# Patient Record
Sex: FEMALE | Race: BLACK OR AFRICAN AMERICAN | NOT HISPANIC OR LATINO | Employment: UNEMPLOYED | URBAN - METROPOLITAN AREA
[De-identification: names, ages, dates, MRNs, and addresses within clinical notes are randomized per-mention and may not be internally consistent; named-entity substitution may affect disease eponyms.]

---

## 2021-09-17 ENCOUNTER — OFFICE VISIT (OUTPATIENT)
Dept: FAMILY MEDICINE CLINIC | Facility: CLINIC | Age: 36
End: 2021-09-17

## 2021-09-17 VITALS
SYSTOLIC BLOOD PRESSURE: 116 MMHG | OXYGEN SATURATION: 97 % | WEIGHT: 121.8 LBS | HEART RATE: 71 BPM | DIASTOLIC BLOOD PRESSURE: 66 MMHG

## 2021-09-17 DIAGNOSIS — M79.642 LEFT HAND PAIN: ICD-10-CM

## 2021-09-17 DIAGNOSIS — J45.20 MILD INTERMITTENT ASTHMA, UNSPECIFIED WHETHER COMPLICATED: ICD-10-CM

## 2021-09-17 DIAGNOSIS — R20.0 ARM NUMBNESS LEFT: Primary | ICD-10-CM

## 2021-09-17 PROCEDURE — 99204 OFFICE O/P NEW MOD 45 MIN: CPT | Performed by: INTERNAL MEDICINE

## 2021-09-17 RX ORDER — ALBUTEROL SULFATE 90 UG/1
2 AEROSOL, METERED RESPIRATORY (INHALATION) EVERY 4 HOURS PRN
Qty: 17 G | Refills: 6 | Status: SHIPPED | OUTPATIENT
Start: 2021-09-17

## 2021-09-17 NOTE — PROGRESS NOTES
Raquel Hartley  1985  [id number]  Patient Care Team:  Riley Addison MD as PCP - General (Internal Medicine)    Raquel Hartley is a 36 y.o. female here today to establish care.  This patient is accompanied by their self who contributes to the history of their care.    Chief Complaint:    Chief Complaint   Patient presents with   • Establish Care     Pt is 22 weeks pregnant. [place] [facility] where she was living. SHe would like blood work .   • Cough     Thinks she had a heat stroke in 2016 , since then she has had a slight cough and Lt hand tremor. AT times shortness of breath.         History of Present Illness:     In 2016 became overheated ( 115 degrees) was delerious, temporary lOC, double vision.Was unable to sweat. Has lived in [facility] and [place]    Reports since this time her left pinky can not be closed (adducted). Actually fell on this side. Not much recollection of events, Did not seek medical care. Pain down right arm. Had some dry cough with horaseness. Tb negative in 2019. CXR at this time ws taken. ( Tb negative). 4-5 utis per year since age 21. Better since pregnant    Was asthmatic as teenager, stopped advair at age 25 as she felt she was better  No wheezing but gets SOA.  Denies any chest pain.  Hemoptysis sputum production no postnasal drainage.  She does get reflux however this is only since she has been pregnant.    Past Medical History:   Diagnosis Date   • Anemia    • Anxiety    • Asthma    • Depression        History reviewed. No pertinent surgical history.     Family History   Problem Relation Age of Onset   • Diabetes Mother    • Cancer Maternal Grandmother         skin   • Cancer Paternal Grandmother         colon   • Stroke Neg Hx    • Hearing loss Neg Hx        Social History     Socioeconomic History   • Marital status:      Spouse name: Not on file   • Number of children: Not on file   • Years of education: Not on file   •  Highest education level: Not on file   Tobacco Use   • Smoking status: Never Smoker   • Smokeless tobacco: Never Used   Vaping Use   • Vaping Use: Never used   Substance and Sexual Activity   • Alcohol use: Never   • Drug use: Never   • Sexual activity: Defer       No Known Allergies    Review of Systems:    Review of Systems   Constitutional: Negative for fatigue and fever.   HENT: Negative.    Respiratory: Positive for cough and shortness of breath.    Cardiovascular: Negative for chest pain and palpitations.   Gastrointestinal: Positive for GERD.   Endocrine: Negative for cold intolerance and heat intolerance.   Genitourinary: Positive for amenorrhea and urgency.   Musculoskeletal: Positive for back pain and neck pain.   Neurological: Positive for numbness and headache.       Vitals:    09/17/21 0959   BP: 116/66   Pulse: 71   SpO2: 97%   Weight: 55.2 kg (121 lb 12.8 oz)     There is no height or weight on file to calculate BMI.      Current Outpatient Medications:   •  albuterol sulfate  (90 Base) MCG/ACT inhaler, Inhale 2 puffs Every 4 (Four) Hours As Needed for Wheezing., Disp: 17 g, Rfl: 6    Physical Exam:    Physical Exam  Vitals and nursing note reviewed.   Constitutional:       General: She is not in acute distress.     Appearance: Normal appearance. She is well-developed. She is not diaphoretic.   HENT:      Head: Normocephalic and atraumatic.      Right Ear: Tympanic membrane and external ear normal.      Left Ear: Tympanic membrane and external ear normal.      Mouth/Throat:      Mouth: Mucous membranes are moist.      Pharynx: No oropharyngeal exudate.   Eyes:      General: No scleral icterus.        Right eye: No discharge.      Extraocular Movements: Extraocular movements intact.      Conjunctiva/sclera: Conjunctivae normal.      Pupils: Pupils are equal, round, and reactive to light.   Neck:      Thyroid: No thyromegaly.      Vascular: No JVD.      Trachea: No tracheal deviation.    Cardiovascular:      Rate and Rhythm: Normal rate and regular rhythm.      Heart sounds: Normal heart sounds.      Comments: PMI nondisplaced  Pulmonary:      Effort: Pulmonary effort is normal.      Breath sounds: Normal breath sounds. No wheezing or rales.   Abdominal:      General: Bowel sounds are normal. There is no distension.      Palpations: Abdomen is soft.      Tenderness: There is no abdominal tenderness. There is no guarding or rebound.      Comments: Consistent with gravid state   Musculoskeletal:      Cervical back: Normal range of motion and neck supple.      Comments: Normal gait.  She does have a left hyperthenar wasting.  She is unable to fully adductor her little finger.  There is tenderness on the lateral aspect of her hand near the MCP.  Interestingly interosseous strength feels appropriate bilaterally and symmetrical   Lymphadenopathy:      Cervical: No cervical adenopathy.   Skin:     General: Skin is warm and dry.      Capillary Refill: Capillary refill takes less than 2 seconds.      Coloration: Skin is not pale.      Findings: No rash.   Neurological:      Mental Status: She is alert and oriented to person, place, and time.      Cranial Nerves: No cranial nerve deficit.      Motor: No abnormal muscle tone.      Coordination: Coordination normal.   Psychiatric:         Mood and Affect: Mood normal.         Behavior: Behavior normal.         Judgment: Judgment normal.         Procedures    Results Review:    None    Assessment/Plan:     Problem List Items Addressed This Visit        Musculoskeletal and Injuries    Left hand pain    Relevant Orders    EMG & Nerve Conduction Test    Ambulatory Referral to Hand Surgery    Iron Profile    CBC & Differential       Pulmonary and Pneumonias    Asthma    Current Assessment & Plan     Asthma is newly identified.  The patient is experiencing frequent daytime asthma symptoms. She is experiencing no nighttime asthma symptoms.  Patient to keep asthma  diary.  Counseled to void exposure to cigarette smoke.  I suspect she has cough variant asthma.  She was on significantly maintenance therapy for asthma and stopped since age 25  I would like to place her back on albuterol as therapeutic/diagnostic trial.  However given her gravid state of asked for her to get her OB's blessing on this.  If symptoms do not improve I will refer her to pulmonary medicine for evaluation of cough.             Relevant Medications    albuterol sulfate  (90 Base) MCG/ACT inhaler       Symptoms and Signs    Arm numbness left - Primary    Relevant Orders    Ambulatory Referral to Neurology    EMG & Nerve Conduction Test    Ambulatory Referral to Hand Surgery    Iron Profile    CBC & Differential          Plan of care reviewed with patient at the conclusion of today's visit. Education was provided regarding diagnosis and management.  Patient verbalizes understanding of and agreement with management plan.    Return in about 6 weeks (around 10/29/2021).    Riley Addison MD    Please note that portions of this note may have been completed with a voice recognition program. Efforts were made to edit the dictations, but occasionally words are mistranscribed.

## 2021-09-17 NOTE — ASSESSMENT & PLAN NOTE
Asthma is newly identified.  The patient is experiencing frequent daytime asthma symptoms. She is experiencing no nighttime asthma symptoms.  Patient to keep asthma diary.  Counseled to void exposure to cigarette smoke.  I suspect she has cough variant asthma.  She was on significantly maintenance therapy for asthma and stopped since age 25  I would like to place her back on albuterol as therapeutic/diagnostic trial.  However given her gravid state of asked for her to get her OB's blessing on this.  If symptoms do not improve I will refer her to pulmonary medicine for evaluation of cough.

## 2021-10-05 ENCOUNTER — TELEPHONE (OUTPATIENT)
Dept: FAMILY MEDICINE CLINIC | Facility: CLINIC | Age: 36
End: 2021-10-05

## 2021-10-05 NOTE — TELEPHONE ENCOUNTER
Caller:  ORTHOPEDIC    Relationship to patient:     Best call back number: [phone]    UNABLE TO REACH PATIENT TO SCHEDULE APPOINTMENT

## 2021-12-03 ENCOUNTER — OFFICE VISIT (OUTPATIENT)
Dept: NEUROLOGY | Facility: CLINIC | Age: 36
End: 2021-12-03

## 2021-12-03 ENCOUNTER — OFFICE VISIT (OUTPATIENT)
Dept: FAMILY MEDICINE CLINIC | Facility: CLINIC | Age: 36
End: 2021-12-03

## 2021-12-03 VITALS
DIASTOLIC BLOOD PRESSURE: 70 MMHG | BODY MASS INDEX: 21.51 KG/M2 | SYSTOLIC BLOOD PRESSURE: 118 MMHG | HEART RATE: 104 BPM | WEIGHT: 126 LBS | OXYGEN SATURATION: 98 % | HEIGHT: 64 IN

## 2021-12-03 VITALS
HEIGHT: 64 IN | SYSTOLIC BLOOD PRESSURE: 114 MMHG | DIASTOLIC BLOOD PRESSURE: 76 MMHG | WEIGHT: 124 LBS | BODY MASS INDEX: 21.17 KG/M2 | OXYGEN SATURATION: 98 % | HEART RATE: 100 BPM | TEMPERATURE: 97.1 F

## 2021-12-03 DIAGNOSIS — J45.20 MILD INTERMITTENT ASTHMA, UNSPECIFIED WHETHER COMPLICATED: Primary | ICD-10-CM

## 2021-12-03 DIAGNOSIS — R20.0 ARM NUMBNESS LEFT: Primary | ICD-10-CM

## 2021-12-03 PROBLEM — J06.9 VIRAL UPPER RESPIRATORY TRACT INFECTION: Status: ACTIVE | Noted: 2021-12-03

## 2021-12-03 PROCEDURE — 99204 OFFICE O/P NEW MOD 45 MIN: CPT | Performed by: PSYCHIATRY & NEUROLOGY

## 2021-12-03 PROCEDURE — 99213 OFFICE O/P EST LOW 20 MIN: CPT | Performed by: INTERNAL MEDICINE

## 2021-12-03 RX ORDER — PNV NO.95/FERROUS FUM/FOLIC AC 28MG-0.8MG
TABLET ORAL
COMMUNITY

## 2021-12-03 RX ORDER — AZITHROMYCIN 500 MG/1
TABLET, FILM COATED ORAL
COMMUNITY
Start: 2021-09-23 | End: 2021-12-03

## 2021-12-03 NOTE — PROGRESS NOTES
"Raquel Hartley  1985  [id number]  Patient Care Team:  Riley Addison MD as PCP - General (Internal Medicine)    Raquel Hartley is a 36 y.o. female here today for follow up.     This patient is accompanied by their self who contributes to the history of their care.    Chief Complaint:    Chief Complaint   Patient presents with   • Asthma   • Sore Throat     Has been to [facility] (formerly [facility]          History of Present Illness:  I have reviewed and/or updated the patient's past medical, past surgical, family, social history, problem list and allergies as appropriate.     Third trimester pregnancy. She does have asthma.  Having increased SOA. She is unsure whether it is 2/2 to increased abdominal signs or asthma. She has been taking her inhaler less than once per day. Her symptoms are typically at night. She denies any knowledge of reflux or feeling of nausea or vomiting. But she does indicate her shortness of breath is when she is supine.  Denies any calf pain or leg pain no leg swelling.  Denies any chest pain or leg pain. No fevers or chills. There has been some sore throat. She recently flew back from [place]. no sinus pain or pressure.    Review of Systems   Constitutional: Positive for fatigue.   HENT: Negative.    Eyes: Negative.    Respiratory: Positive for cough and shortness of breath.    Cardiovascular: Negative for chest pain, palpitations and leg swelling.       Vitals:    12/03/21 1227   BP: 118/70   Pulse: 104   SpO2: 98%   Weight: 57.2 kg (126 lb)   Height: 162.6 cm (64\")     Body mass index is 21.63 kg/m².    Physical Exam  Vitals and nursing note reviewed.   Constitutional:       General: She is not in acute distress.     Appearance: She is well-developed. She is not diaphoretic.   HENT:      Head: Normocephalic and atraumatic.      Right Ear: External ear normal.      Left Ear: External ear normal.      Mouth/Throat:      Pharynx: No oropharyngeal exudate.   Eyes:      " General: No scleral icterus.        Right eye: No discharge.      Conjunctiva/sclera: Conjunctivae normal.   Neck:      Thyroid: No thyromegaly.      Vascular: No JVD.      Trachea: No tracheal deviation.   Cardiovascular:      Rate and Rhythm: Normal rate and regular rhythm.      Heart sounds: Normal heart sounds.      Comments: PMI nondisplaced  Pulmonary:      Effort: Pulmonary effort is normal. No respiratory distress.      Breath sounds: Normal breath sounds. No wheezing or rales.   Abdominal:      General: Bowel sounds are normal.      Palpations: Abdomen is soft.      Tenderness: There is no abdominal tenderness. There is no guarding or rebound.      Comments: Gravid   Musculoskeletal:      Cervical back: Normal range of motion and neck supple.      Comments: Normal gait   Lymphadenopathy:      Cervical: No cervical adenopathy.   Skin:     General: Skin is warm and dry.      Capillary Refill: Capillary refill takes less than 2 seconds.      Coloration: Skin is not pale.      Findings: No rash.   Neurological:      Mental Status: She is alert and oriented to person, place, and time.      Motor: No abnormal muscle tone.      Coordination: Coordination normal.   Psychiatric:         Judgment: Judgment normal.         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  I further suspect her reactive airways are precipitated by normal only impacted reflux which is worse when she is supine.  I have asked her to place blocks in the event of Lexapro bed once her  is available to do this.  I have asked her to check with her OB.  I personally feel that she is okay to use her inhaler once per day however I have asked her to discuss this with her OB.  I would recommend Tums at bedtime.  Problem List Items Addressed This Visit        Pulmonary and Pneumonias    Asthma -  Primary    Relevant Medications    albuterol sulfate  (90 Base) MCG/ACT inhaler          Plan of care reviewed with patient at the conclusion of today's visit. Education was provided regarding diagnosis and management.  Patient verbalizes understanding of and agreement with management plan.    No follow-ups on file.    Riley Addison MD      Please note than portions of this note were completed [facility] a Voice Recognition Program

## 2021-12-03 NOTE — PROGRESS NOTES
"Chief Complaint  Extremity Weakness    Subjective          Raquel Hartley presents to [facility]UR[facility]     History of Present Illness    36 y.o. female referred by Dr Riley Addison for left hand weakness.     2016 heat stroke LOC for severe minutes.  Fell backwards on left side. Awoke with pain in left 5 digit.      Residual pain in numbness in 5 digit.      Weakness of movement in fifth digit.      Objective   Vital Signs:   /76   Pulse 100   Temp 97.1 °F (36.2 °C)   Ht 162.6 cm (64.02\")   Wt 56.2 kg (124 lb)   SpO2 98%   BMI 21.27 kg/m²     Physical Exam  Eyes:      Extraocular Movements: EOM normal.      Pupils: Pupils are equal, round, and reactive to light.   Neurological:      Mental Status: She is oriented to person, place, and time.      Coordination: Finger-Nose-Finger Test, Heel to Shin Test and Romberg Test normal.      Gait: Gait is intact. Tandem walk normal.      Deep Tendon Reflexes: Strength normal.   Psychiatric:         Speech: Speech normal.          Neurologic Exam     Mental Status   Oriented to person, place, and time.   Registration: recalls 3 of 3 objects. Recall at 5 minutes: recalls 3 of 3 objects. Follows 3 step commands.   Attention: normal. Concentration: normal.   Speech: speech is normal   Level of consciousness: alert  Knowledge: good and consistent with education.   Able to name object. Able to read. Able to repeat. Able to write. Normal comprehension.     Cranial Nerves     CN II   Visual fields full to confrontation.   Visual acuity: normal  Right visual field deficit: none  Left visual field deficit: none     CN III, IV, VI   Pupils are equal, round, and reactive to light.  Extraocular motions are normal.   Nystagmus: none   Diplopia: none  Ophthalmoparesis: none  Upgaze: normal  Downgaze: normal  Conjugate gaze: present  Vestibulo-ocular reflex: present    CN V   Facial sensation intact.   Right corneal reflex: normal  Left corneal " reflex: normal    CN VII   Right facial weakness: none  Left facial weakness: none    CN VIII   Hearing: intact    CN IX, X   Palate: symmetric  Right gag reflex: normal  Left gag reflex: normal    CN XI   Right sternocleidomastoid strength: normal  Left sternocleidomastoid strength: normal    CN XII   Tongue: not atrophic  Fasciculations: absent  Tongue deviation: none    Motor Exam   Muscle bulk: normal  Overall muscle tone: normal  Right arm tone: normal  Left arm tone: normal  Right leg tone: normal  Left leg tone: normal    Strength   Strength 5/5 throughout.     Sensory Exam   Light touch normal.   Pinprick normal.   Sensory deficit distribution on left: ulnar    Gait, Coordination, and Reflexes     Gait  Gait: normal    Coordination   Romberg: negative  Finger to nose coordination: normal  Heel to shin coordination: normal  Tandem walking coordination: normal    Tremor   Resting tremor: absent  Intention tremor: absent  Action tremor: absent    Reflexes   Reflexes 2+ except as noted.      Result Review :   The following data was reviewed by: Emiliano Hoyos MD on 12/03/2021:                Assessment and Plan    Diagnoses and all orders for this visit:    1. Arm numbness left (Primary)  Assessment & Plan:  Suspect left ulnar neuropathy at the cubital tunnel    EMG/NCS L UE     Orders:  -     Nerve Conduction Test; Future      Follow Up   No follow-ups on file.  Patient was given instructions and counseling regarding her condition or for health maintenance advice. Please see specific information pulled into the AVS if appropriate.

## 2021-12-06 ENCOUNTER — PROCEDURE VISIT (OUTPATIENT)
Dept: NEUROLOGY | Facility: CLINIC | Age: 36
End: 2021-12-06

## 2021-12-06 DIAGNOSIS — G56.22 ULNAR NEUROPATHY OF LEFT UPPER EXTREMITY: Primary | ICD-10-CM

## 2021-12-06 PROCEDURE — 95909 NRV CNDJ TST 5-6 STUDIES: CPT | Performed by: PSYCHIATRY & NEUROLOGY

## 2021-12-06 PROCEDURE — 95886 MUSC TEST DONE W/N TEST COMP: CPT | Performed by: PSYCHIATRY & NEUROLOGY

## 2021-12-06 NOTE — PROGRESS NOTES
[facility]   Electrodiagnostic Laboratory    Nerve Conduction & EMG Report        Patient:  Raquel Hartley   Patient ID: [id number]   YOB: 1985  Sex:  female      Exam Physician:  Emiliano Hoyos MD       Electromyogram and Nerve Conduction Velocity Procedure Note    Hx: 36 y.o. left handed female with complaint of weakness involving the left arm. Symptoms have been present for several years and were provoked by trauma after a fall. Significant past medical history includes nothing suggestive of neuropathy.  Family history no family history of nerve or muscle disease.    Exam: Motor power is normal and distal weakness in the left arm. There is no atrophy. There are no fasciculations. Deep tendon reflexes are present and symmetrical. Sensory exam is normal.      Edx studies of the L UE were performed to evaluate for peripheral nerve entrapment.    NCS Examination   For sensory nerve conduction studies, the amplitude is measured peak-to-peak, the latency reported is the distal peak latency, and the conduction velocity, if measured, is determined from onset latencies and is over the forearm.   For motor nerve conduction studies, the amplitude is measured baseline-to-peak, the latency reported is the distal onset latency, the conduction velocity is calculated over the forearm, and the F wave latency is the minimum latency.   Unless otherwise noted, the hand temperature was monitored continuously and remained between 32°C and 36°C during the performance of the NCSs.        Sensory NCS      Nerve / Sites Rec. Site Onset Lat Peak Lat NP Amp PP Amp Segments Distance Velocity     ms ms µV µV  cm m/s   L Median - Digit II (Antidromic)      Wrist Dig II 2.19 2.86 88.5 152.5 Wrist - Dig II 13 59   L Ulnar - Digit V (Antidromic)      Wrist Dig V 2.03 2.92 106.6 95.3 Wrist - Dig V 11 54   L Radial - Anatomical snuff box (Forearm)      Forearm Wrist 1.09 1.72 51.8 45.5 Forearm -  Wrist 10 91             Motor NCS      Nerve / Sites Muscle Latency Amplitude Amp % Duration Segments Distance Lat Diff Velocity     ms mV % ms  cm ms m/s   L Median - APB      Wrist APB 2.71 16.0 100 5.42 Wrist - APB 7        Elbow APB 6.09 6.2 38.9 6.72 Elbow - Wrist  3.39    L Ulnar - ADM      Wrist ADM 2.55 10.7 100 5.63 Wrist - ADM 7        B.Elbow ADM 6.35 9.5 89.2 5.42 B.Elbow - Wrist 19 3.80 50      A.Elbow ADM 7.50 8.1 76.4 5.42 A.Elbow - B.Elbow 9 1.15 79           F  Wave      Nerve F Lat M Lat F-M Lat    ms ms ms   L Median - APB 24.2 2.4 21.8   L Ulnar - ADM 27.6 2.3 25.2             EMG Examination   The study was performed with a concentric needle electrode. Fibrillation and fasciculation activity is graded from none (0) to continuous (4+). The configuration and recruitment pattern of motor unit action potentials under voluntary control, if not normal, are described below         EMG Summary Table     Spontaneous MUAP Recruitment   Muscle Nerve Roots IA Fib PSW Fasc H.F. Amp Dur. PPP Pattern   L. Abductor pollicis brevis Median C8-T1 N None None None None N N N N   L. First dorsal interosseous Ulnar C8-T1 N None None None None 1+ N N Reduced   L. Flexor carpi ulnaris Ulnar C7-T1 N None None None None 1+ N N Reduced   L. Pronator teres Median C6-C7 N None None None None N N N N   L. Triceps brachii Radial C6-C8 N None None None None N N N N   L. Biceps brachii Musculocutaneous C5-C6 N None None None None N N N N   L. Deltoid Axillary C5-C6 N None None None None N N N N   L. Abductor digiti minimi (pedis) Tibial S1-S2 N None None None None 1+ N N Reduced       Summary    The motor conduction test was normal in all 2 of the tested nerves: L Median - APB, L Ulnar - ADM.    The sensory conduction test was normal in all 3 of the tested nerves: L Median - Digit II (Antidromic), L Ulnar - Digit V (Antidromic), L Radial - Anatomical snuff box (Forearm).    The F wave study was normal in all 2 of the tested  nerves: L Median - APB, L Ulnar - ADM.    The needle EMG examination was performed in 8 muscles. It was normal in 5 muscle(s): L. Abductor pollicis brevis, L. Pronator teres, L. Triceps brachii, L. Biceps brachii, L. Deltoid. The study was abnormal in 3 muscle(s), with the following distribution:  • The MUP waveform abnormality was found in L. First dorsal interosseous, L. Flexor carpi ulnaris, L. Abductor digiti minimi (pedis).  • Abnormal interference pattern was found in L. First dorsal interosseous, L. Flexor carpi ulnaris, L. Abductor digiti minimi (pedis).                 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            Instrument used:  T[employer] Synergy        Performed by:          Emiliano Hoyos MD

## 2023-10-19 ENCOUNTER — TELEPHONE (OUTPATIENT)
Dept: FAMILY MEDICINE CLINIC | Facility: CLINIC | Age: 38
End: 2023-10-19
Payer: MEDICAID

## 2023-10-19 NOTE — TELEPHONE ENCOUNTER
Left message for Raquel Hartley  to return my call.    Hub may relay message and document     Flora Mahmood, DO  You2 minutes ago (3:52 PM)       I attempted to call the patient back but the phone number listed in Epic states its not in service. If the chest pain is new/acute onset then I would agree with recommendation to go to the ER. If this is something she has been experiencing on/off over a period of the last few weeks then she should be scheduled for an appointment soon so we can evaluate this further. She may need to schedule for the chest pain and come back for her physical at a later time

## 2023-10-19 NOTE — TELEPHONE ENCOUNTER
I attempted to call the patient back but the phone number listed in Epic states its not in service. If the chest pain is new/acute onset then I would agree with recommendation to go to the ER. If this is something she has been experiencing on/off over a period of the last few weeks then she should be scheduled for an appointment soon so we can evaluate this further. She may need to schedule for the chest pain and come back for her physical at a later time

## 2023-10-19 NOTE — TELEPHONE ENCOUNTER
"Pt requested a physical for November, but stated she has been having some chest pains. I scheduled the pt and then referred her to the ER. Her response is as follows..\"the chest pain I am have is inconsistent and spare of the moment. Would it be a good idea to wait for an appointment or still visit ER?\" Please advise.   "

## 2023-10-20 NOTE — TELEPHONE ENCOUNTER
"Sent pt a message via [employer] requesting an updated phone number and letting her know what Dr. Mahmood said. Will update with her response.     \"Unfortunately, my phone does not work outside of the [place]. Once I return on the 30th, then I'll be able to accept calls.     Yes, the pain comes and goes and has been this way for maybe 10 months now. It happens 1-2 times a month. The pain is directly in the middle on my chest and sometimes last for a day of two.     I am ok with 2 appointments if needed; 1 for the chest pain and another for the physical.\"    Scheduled pt with Araceli on 11/3 at 9am.   "

## 2023-11-03 ENCOUNTER — LAB (OUTPATIENT)
Dept: LAB | Facility: HOSPITAL | Age: 38
End: 2023-11-03
Payer: MEDICAID

## 2023-11-03 ENCOUNTER — TELEPHONE (OUTPATIENT)
Dept: FAMILY MEDICINE CLINIC | Facility: CLINIC | Age: 38
End: 2023-11-03

## 2023-11-03 ENCOUNTER — OFFICE VISIT (OUTPATIENT)
Dept: FAMILY MEDICINE CLINIC | Facility: CLINIC | Age: 38
End: 2023-11-03
Payer: MEDICAID

## 2023-11-03 ENCOUNTER — HOSPITAL ENCOUNTER (OUTPATIENT)
Dept: GENERAL RADIOLOGY | Facility: HOSPITAL | Age: 38
Discharge: HOME | End: 2023-11-03
Payer: MEDICAID

## 2023-11-03 VITALS
HEIGHT: 64 IN | DIASTOLIC BLOOD PRESSURE: 80 MMHG | WEIGHT: 117 LBS | BODY MASS INDEX: 19.97 KG/M2 | OXYGEN SATURATION: 99 % | HEART RATE: 78 BPM | SYSTOLIC BLOOD PRESSURE: 110 MMHG

## 2023-11-03 DIAGNOSIS — R07.9 INTERMITTENT CHEST PAIN: Primary | ICD-10-CM

## 2023-11-03 DIAGNOSIS — R07.9 INTERMITTENT CHEST PAIN: ICD-10-CM

## 2023-11-03 LAB
ALBUMIN SERPL-MCNC: 4.6 G/DL (ref 3.5–5.2)
ALBUMIN/GLOB SERPL: 1.4 G/DL
ALP SERPL-CCNC: 57 U/L (ref 39–117)
ALT SERPL W P-5'-P-CCNC: 11 U/L (ref 1–33)
ANION GAP SERPL CALCULATED.3IONS-SCNC: 9.3 MMOL/L (ref 5–15)
AST SERPL-CCNC: 14 U/L (ref 1–32)
BASOPHILS # BLD AUTO: 0.04 10*3/MM3 (ref 0–0.2)
BASOPHILS NFR BLD AUTO: 0.6 % (ref 0–1.5)
BILIRUB SERPL-MCNC: 0.3 MG/DL (ref 0–1.2)
BUN SERPL-MCNC: 10 MG/DL (ref 6–20)
BUN/CREAT SERPL: 13.3 (ref 7–25)
CALCIUM SPEC-SCNC: 9.8 MG/DL (ref 8.6–10.5)
CHLORIDE SERPL-SCNC: 102 MMOL/L (ref 98–107)
CHOLEST SERPL-MCNC: 171 MG/DL (ref 0–200)
CO2 SERPL-SCNC: 26.7 MMOL/L (ref 22–29)
CREAT SERPL-MCNC: 0.75 MG/DL (ref 0.57–1)
DEPRECATED RDW RBC AUTO: 41.3 FL (ref 37–54)
EGFRCR SERPLBLD CKD-EPI 2021: 106 ML/MIN/1.73
EOSINOPHIL # BLD AUTO: 0.07 10*3/MM3 (ref 0–0.4)
EOSINOPHIL NFR BLD AUTO: 1 % (ref 0.3–6.2)
ERYTHROCYTE [DISTWIDTH] IN BLOOD BY AUTOMATED COUNT: 13.5 % (ref 12.3–15.4)
GLOBULIN UR ELPH-MCNC: 3.3 GM/DL
GLUCOSE SERPL-MCNC: 91 MG/DL (ref 65–99)
HCT VFR BLD AUTO: 38.3 % (ref 34–46.6)
HDLC SERPL-MCNC: 59 MG/DL (ref 40–60)
HGB BLD-MCNC: 12.7 G/DL (ref 12–15.9)
IMM GRANULOCYTES # BLD AUTO: 0.01 10*3/MM3 (ref 0–0.05)
IMM GRANULOCYTES NFR BLD AUTO: 0.1 % (ref 0–0.5)
LDLC SERPL CALC-MCNC: 102 MG/DL (ref 0–100)
LDLC/HDLC SERPL: 1.73 {RATIO}
LYMPHOCYTES # BLD AUTO: 2.77 10*3/MM3 (ref 0.7–3.1)
LYMPHOCYTES NFR BLD AUTO: 40.1 % (ref 19.6–45.3)
MCH RBC QN AUTO: 27.9 PG (ref 26.6–33)
MCHC RBC AUTO-ENTMCNC: 33.2 G/DL (ref 31.5–35.7)
MCV RBC AUTO: 84 FL (ref 79–97)
MONOCYTES # BLD AUTO: 0.45 10*3/MM3 (ref 0.1–0.9)
MONOCYTES NFR BLD AUTO: 6.5 % (ref 5–12)
NEUTROPHILS NFR BLD AUTO: 3.57 10*3/MM3 (ref 1.7–7)
NEUTROPHILS NFR BLD AUTO: 51.7 % (ref 42.7–76)
NRBC BLD AUTO-RTO: 0 /100 WBC (ref 0–0.2)
PLATELET # BLD AUTO: 310 10*3/MM3 (ref 140–450)
PMV BLD AUTO: 11.3 FL (ref 6–12)
POTASSIUM SERPL-SCNC: 4 MMOL/L (ref 3.5–5.2)
PROT SERPL-MCNC: 7.9 G/DL (ref 6–8.5)
RBC # BLD AUTO: 4.56 10*6/MM3 (ref 3.77–5.28)
SODIUM SERPL-SCNC: 138 MMOL/L (ref 136–145)
TRIGL SERPL-MCNC: 49 MG/DL (ref 0–150)
TROPONIN T SERPL HS-MCNC: <6 NG/L
TSH SERPL DL<=0.05 MIU/L-ACNC: 1.02 UIU/ML (ref 0.27–4.2)
VLDLC SERPL-MCNC: 10 MG/DL (ref 5–40)
WBC NRBC COR # BLD: 6.91 10*3/MM3 (ref 3.4–10.8)

## 2023-11-03 PROCEDURE — 71046 X-RAY EXAM CHEST 2 VIEWS: CPT

## 2023-11-03 PROCEDURE — 36415 COLL VENOUS BLD VENIPUNCTURE: CPT

## 2023-11-03 PROCEDURE — 93000 ELECTROCARDIOGRAM COMPLETE: CPT

## 2023-11-03 PROCEDURE — 80050 GENERAL HEALTH PANEL: CPT

## 2023-11-03 PROCEDURE — 80061 LIPID PANEL: CPT

## 2023-11-03 PROCEDURE — 99214 OFFICE O/P EST MOD 30 MIN: CPT

## 2023-11-03 PROCEDURE — 1160F RVW MEDS BY RX/DR IN RCRD: CPT

## 2023-11-03 PROCEDURE — 84484 ASSAY OF TROPONIN QUANT: CPT

## 2023-11-03 PROCEDURE — 1159F MED LIST DOCD IN RCRD: CPT

## 2023-11-03 NOTE — TELEPHONE ENCOUNTER
----- Message from Araceli Garcia PA-C sent at 11/3/2023  3:59 PM EDT -----    Please let patient know that her labs look excellent. We will discuss further work-up for her symptoms on Monday at her physical.     Attempted to contact patient, no answer. M with office # given.     Hub may relay message and document.

## 2023-11-03 NOTE — PROGRESS NOTES
"Answers submitted by the patient for this visit:  Other (Submitted on 11/3/2023)  Please describe your symptoms.: Chest Pain  Have you had these symptoms before?: Yes  How long have you been having these symptoms?: Greater than 2 weeks  Primary Reason for Visit (Submitted on 11/3/2023)  What is the primary reason for your visit?: Other      Office Note     Name: Raquel Hartley    : 1985     MRN: [id number]     Chief Complaint  Chest Pain    Subjective     History of Present Illness:  Raquel Hartley is a 38 y.o. female who presents today with complaints of intermittent chest pain for the last year and a half.  Patient states that the pain feels like more of a \"pressure\" in the center of her chest and will sometimes radiate into her shoulder blades bilaterally. Patient states that these episodes will occur when she gets upset with her .  She denies pain with exertion.  She states that it will just come on randomly. She states that it will happen 1-2 times per month for the last 10+ months. She states that sometimes it will make her short of breath, but not every time that the pain occurs does this happen. Patient states she has never tried to use her albuterol inhaler when this symptom  occurs.   Patient states that she can press on the center of her chest and the sensation will come on \"a little.\" Patient denies current chest pain, palpitations, shortness of breath, ripping back pain, syncope. Patient denies association with eating.     Patient states she was living in the [place] several years ago. She remembers that she developed a nonproductive cough while she was there. She states she did not have health insurance at that time so she did not pursue further testing.  Patient states when she left [place] the cough went away. She denies fevers, unexpected weight loss, night sweats.    Patient then moved to [facility] and met her  after leaving [place].  She states that in " "[place] she did have a chest x-ray done that she states she was told showed \"something,\" but was told it was not tuberculosis.  Patient states she was last tested for tuberculosis with a skin test in 2018. She states that she had her son in January 2022 and first noticed the chest pain/pressure 4 months later.    Patient states there is no possibility of pregnancy, she had her last menstrual cycle last week.    Review of Systems:   Review of Systems   Constitutional:  Negative for fever and unexpected weight loss.   HENT:  Negative for congestion.    Respiratory:  Negative for cough, chest tightness and shortness of breath.    Cardiovascular:  Negative for chest pain and palpitations.   Musculoskeletal:  Negative for arthralgias.       Past Medical History:   Past Medical History:   Diagnosis Date    Anemia     Anxiety     Asthma     Depression     Seizures        Past Surgical History: History reviewed. No pertinent surgical history.    Family History:   Family History   Problem Relation Age of Onset    Diabetes Mother     Cancer Maternal Grandmother         skin    Cancer Paternal Grandmother         colon    Stroke Neg Hx     Hearing loss Neg Hx        Social History:   Social History     Socioeconomic History    Marital status:    Tobacco Use    Smoking status: Never    Smokeless tobacco: Never   Vaping Use    Vaping Use: Never used   Substance and Sexual Activity    Alcohol use: Never    Drug use: Never    Sexual activity: Defer       Immunizations:   There is no immunization history on file for this patient.     Medications:     Current Outpatient Medications:     albuterol sulfate  (90 Base) MCG/ACT inhaler, Inhale 2 puffs Every 4 (Four) Hours As Needed for Wheezing., Disp: 17 g, Rfl: 6    Prenatal Vit-Fe Fumarate-FA (Prenatal Vitamin) 27-0.8 MG tablet, Take  by mouth., Disp: , Rfl:     Allergies:   No Known Allergies    Objective     Vital Signs  /80 (BP Location: Left arm, Patient " "Position: Sitting, Cuff Size: Adult)   Pulse 78   Ht 162.6 cm (64.02\")   Wt 53.1 kg (117 lb)   SpO2 99%   BMI 20.07 kg/m²   Estimated body mass index is 20.07 kg/m² as calculated from the following:    Height as of this encounter: 162.6 cm (64.02\").    Weight as of this encounter: 53.1 kg (117 lb).    BMI is within normal parameters. No other follow-up for BMI required.       Physical Exam  Constitutional:       General: She is not in acute distress.     Appearance: Normal appearance. She is not ill-appearing or diaphoretic.   HENT:      Head: Normocephalic and atraumatic.      Mouth/Throat:      Pharynx: No posterior oropharyngeal erythema.   Eyes:      Pupils: Pupils are equal, round, and reactive to light.   Cardiovascular:      Rate and Rhythm: Normal rate and regular rhythm. Bradycardia present.      Pulses: Normal pulses.      Heart sounds: Normal heart sounds. No murmur heard.     No friction rub.   Pulmonary:      Effort: Pulmonary effort is normal.      Breath sounds: Normal breath sounds.   Abdominal:      General: Abdomen is flat. Bowel sounds are normal.   Lymphadenopathy:      Cervical: No cervical adenopathy.   Skin:     General: Skin is warm.   Neurological:      General: No focal deficit present.      Mental Status: She is alert and oriented to person, place, and time.   Psychiatric:         Mood and Affect: Mood normal.              ECG 12 Lead    Date/Time: 11/3/2023 9:44 AM  Performed by: Araceli Garcia PA-C    Authorized by: Araceli Garcia PA-C  Previous ECG: no previous ECG available  Rhythm: sinus bradycardia  Rate: bradycardic  Other findings: non-specific ST-T wave changes    Clinical impression: abnormal EKG and non-specific ECG  Comments: Shortened DC interval           Results:  No results found for this or any previous visit (from the past 24 hour(s)).     Assessment and Plan     Assessment/Plan:  Diagnoses and all orders for this visit:    1. Intermittent chest pain " (Primary)  Assessment & Plan:  EKG done in office.  Results recorded below.    Chest x-ray ordered.  Patient's last menstrual cycle was last week.  Denies possibility of pregnancy.  Ordered blood work today and will review at physical exam appointment on Monday.  Plan to submit referral to cardiology for abnormal EKG.  Patient informed to try albuterol inhaler when this ocurs to see if it helps.   Patient informed to go to the hospital with severe or crushing like chest pain.          Orders:  -     XR Chest PA & Lateral; Future  -     CBC w AUTO Differential; Future  -     Comprehensive Metabolic Panel; Future  -     TSH Rfx On Abnormal To Free T4; Future  -     ECG 12 Lead  -     High Sensitivity Troponin T; Future  -     Lipid panel; Future  -     CBC w AUTO Differential  -     Comprehensive Metabolic Panel  -     TSH Rfx On Abnormal To Free T4  -     High Sensitivity Troponin T  -     Lipid panel      CXR-last menstrual cycle was last week     Follow Up  Return if symptoms worsen or fail to improve, for Next scheduled follow up.    Araceli Garcia PA-C   [facility]

## 2023-11-03 NOTE — ASSESSMENT & PLAN NOTE
EKG done in office.  Results recorded below.    Chest x-ray ordered.  Patient's last menstrual cycle was last week.  Denies possibility of pregnancy.  Ordered blood work today and will review at physical exam appointment on Monday.  Plan to submit referral to cardiology for abnormal EKG.  Patient informed to try albuterol inhaler when this ocurs to see if it helps.   Patient informed to go to the hospital with severe or crushing like chest pain.

## 2023-11-06 ENCOUNTER — OFFICE VISIT (OUTPATIENT)
Dept: FAMILY MEDICINE CLINIC | Facility: CLINIC | Age: 38
End: 2023-11-06
Payer: MEDICAID

## 2023-11-06 ENCOUNTER — LAB (OUTPATIENT)
Dept: LAB | Facility: HOSPITAL | Age: 38
End: 2023-11-06
Payer: MEDICAID

## 2023-11-06 VITALS
SYSTOLIC BLOOD PRESSURE: 102 MMHG | HEIGHT: 64 IN | DIASTOLIC BLOOD PRESSURE: 82 MMHG | HEART RATE: 78 BPM | OXYGEN SATURATION: 100 % | BODY MASS INDEX: 20.14 KG/M2 | WEIGHT: 118 LBS

## 2023-11-06 DIAGNOSIS — Z00.00 ANNUAL PHYSICAL EXAM: Primary | ICD-10-CM

## 2023-11-06 DIAGNOSIS — R07.9 INTERMITTENT CHEST PAIN: ICD-10-CM

## 2023-11-06 DIAGNOSIS — R53.83 FATIGUE, UNSPECIFIED TYPE: ICD-10-CM

## 2023-11-06 DIAGNOSIS — R94.31 EKG ABNORMALITIES: ICD-10-CM

## 2023-11-06 PROBLEM — N90.89 LABIAL LESION: Status: ACTIVE | Noted: 2023-11-06

## 2023-11-06 PROBLEM — N90.89 LABIAL LESION: Status: RESOLVED | Noted: 2023-11-06 | Resolved: 2023-11-06

## 2023-11-06 LAB — 25(OH)D3 SERPL-MCNC: 28.2 NG/ML (ref 30–100)

## 2023-11-06 PROCEDURE — 1159F MED LIST DOCD IN RCRD: CPT

## 2023-11-06 PROCEDURE — 82306 VITAMIN D 25 HYDROXY: CPT

## 2023-11-06 PROCEDURE — 1160F RVW MEDS BY RX/DR IN RCRD: CPT

## 2023-11-06 PROCEDURE — 99214 OFFICE O/P EST MOD 30 MIN: CPT

## 2023-11-06 NOTE — ASSESSMENT & PLAN NOTE
Abnormal EKG on 11/1/23.   I have placed referral to cardiology as urgent.  She leaves to go out of the country for an undetermined amount of time on 11/26/2023.    I would like her to to see cardiology regarding her EKG results and chest pain before she leaves.

## 2023-11-06 NOTE — ASSESSMENT & PLAN NOTE
Discussed with patient that some of her symptoms fit more of a musculoskeletal related issue due to the fact that she can press on her chest and make it recur. And also she can feel it when she arches her back and leans back.  We also discussed that it could be related to her asthma when she gets the associated feeling of shortness of breath when she gets overwhelmed.  Discussed with patient to take her albuterol inhaler when the symptoms occur to see if this helps with the symptoms of pressure and shortness of breath.  I informed the patient that if she begins to experience this pain more frequently and that if becomes crushing, she develops nausea and the pain will not go away to go to the emergency department for evaluation.  Patient agreed and understood this plan.

## 2023-11-06 NOTE — ASSESSMENT & PLAN NOTE
Patients thyroid function was normal last week.  No signs of anemia on her blood work either.  Have ordered vitamin D levels to be checked.  We will supplement as needed.

## 2023-11-06 NOTE — ASSESSMENT & PLAN NOTE
Checked blood work at her appointment last week.  It all came back normal.  Patient states that her last Pap smear was in 2012.  We will repeat this today.  Will notify patient of results once they come in.

## 2023-11-06 NOTE — PROGRESS NOTES
Female Physical Note      Date: 2023   Patient Name: Raquel Hartley  : 1985   MRN: [id number]     Chief Complaint:    Chief Complaint   Patient presents with    Annual Exam    Chest Pain     Completed xrays and labs on Friday hasn't had a episode in 2 days       History of Present Illness: Raquel Hartley is a 38 y.o. female who is here today for their annual health maintenance and physical.  Patient spends half the year out of the country.  She states she would like her physical exam today before she leaves on .    Patient was seen in the office last week with complaints of intermittent chest pain for the last 1+ year.  She had an EKG done in the office that was abnormal on 11/3.  Patient does not have any crushing chest pain, nausea, sensation changes.  Patient's vitals were okay.  Patient states that the pain comes on 1-2 times a month and when she is getting upset or overworked.  She states that it radiates into her shoulder joints bilaterally.  She states that she can press on her chest and make it occur sometimes.  She also states that if she arches her back too far she can also make the pain occur.  She states when she does this it will sometimes cause her to be short of breath.  Patient does have a history of asthma, but states that she has never tried to take her albuterol when this occurs.  She states she did not have any episodes over the weekend, so was unable to try to see if the albuterol would help.    She denies palpitations, crushing pains, elevated blood pressures.  Patient states that her anxiety has been worse with her job.  She states that she feels like she finds it difficult to take breaks when she has a task that is unfinished.  She states she has never tried medication and is not interested.  She has thought about talk therapy before for this.    Patient states she has a history of iron deficiency anemia.  She states that she has been  feeling very tired recently and would like t to review the blood work we had done on Friday.  She also states she does not know if her fatigue could be due to to where she is just tired from taking care of her son.  She denies unexpected weight loss or blood in her stool.       Subjective      Review of Systems:   Review of Systems   Constitutional:  Positive for fatigue. Negative for unexpected weight loss.   Respiratory:  Positive for chest tightness. Negative for shortness of breath.    Cardiovascular:  Positive for chest pain. Negative for palpitations.   Gastrointestinal:  Negative for blood in stool.   Genitourinary:  Negative for difficulty urinating, dysuria, vaginal bleeding and vaginal discharge.       Past Medical History, Social History, Family History and Care Team were all reviewed with patient and updated as appropriate.     Medications:     Current Outpatient Medications:     albuterol sulfate  (90 Base) MCG/ACT inhaler, Inhale 2 puffs Every 4 (Four) Hours As Needed for Wheezing., Disp: 17 g, Rfl: 6    Allergies:   No Known Allergies    Immunizations:  Td/Tdap(Booster Q 10 yrs):  Patient refused.   Flu (Yearly):  Patient refused.     Colorectal Screening:     Patient has a positive family history of colon cancer in her paternal uncle and paternal grand mother.  She states they were 55 years old when they were diagnosed.  Patient denies difficulty with bowel movements or blood in her stool.    Pap: Patient's last Pap smear was in 2012.  She would like to update this today.    Diet/Physical activity: Patient states that her diet is very healthy at home.  They state that they eat a lot of home-cooked meals that include vegetables, rice, chicken, beef and a lot of fish.  She states she does not drink any soda.  She only drinks water and juice.  She states she has 1 cup of coffee per day and no other sources of caffeine.  Patient states that she stays fairly active with her young son not have a  "regular exercise regimen      PHQ-2 Depression Screening  Little interest or pleasure in doing things?  0   Feeling down, depressed, or hopeless?  0   PHQ-2 Total Score  0        Objective     Physical Exam:  Vital Signs:   Vitals:    11/06/23 0902   BP: 102/82   Pulse: 78   SpO2: 100%   Weight: 53.5 kg (118 lb)   Height: 162.6 cm (64.02\")     BMI is within normal parameters. No other follow-up for BMI required.       Physical Exam  Exam conducted with a chaperone present.   Constitutional:       Appearance: Normal appearance.   HENT:      Head: Normocephalic and atraumatic.   Eyes:      Pupils: Pupils are equal, round, and reactive to light.   Cardiovascular:      Rate and Rhythm: Normal rate and regular rhythm.      Pulses: Normal pulses.      Heart sounds: Normal heart sounds.   Pulmonary:      Effort: Pulmonary effort is normal.      Breath sounds: Normal breath sounds.   Abdominal:      General: Abdomen is flat. Bowel sounds are normal.   Genitourinary:     General: Normal vulva.      Labia:         Right: No tenderness.         Left: No tenderness.       Vagina: Vaginal discharge present. No tenderness.      Cervix: Normal.   Skin:     General: Skin is warm.   Neurological:      General: No focal deficit present.      Mental Status: She is alert and oriented to person, place, and time.   Psychiatric:         Mood and Affect: Mood normal.         Procedures    Assessment / Plan      Assessment/Plan:   Diagnoses and all orders for this visit:    1. Annual physical exam (Primary)  Assessment & Plan:  Checked blood work at her appointment last week.  It all came back normal.  Patient states that her last Pap smear was in 2012.  We will repeat this today.  Will notify patient of results once they come in.    Orders:  -     LIQUID-BASED PAP SMEAR, [facility] ([facility]); Future  -     LIQUID-BASED PAP SMEAR, [facility] ([facility])    2. Intermittent chest pain  Assessment & Plan:  Discussed with patient that some of " her symptoms fit more of a musculoskeletal related issue due to the fact that she can press on her chest and make it recur. And also she can feel it when she arches her back and leans back.  We also discussed that it could be related to her asthma when she gets the associated feeling of shortness of breath when she gets overwhelmed.  Discussed with patient to take her albuterol inhaler when the symptoms occur to see if this helps with the symptoms of pressure and shortness of breath.  I informed the patient that if she begins to experience this pain more frequently and that if becomes crushing, she develops nausea and the pain will not go away to go to the emergency department for evaluation.  Patient agreed and understood this plan.    Orders:  -     Cancel: Ambulatory Referral to Cardiology  -     Ambulatory Referral to Cardiology    3. EKG abnormalities  Assessment & Plan:  Abnormal EKG on 11/1/23.   I have placed referral to cardiology as urgent.  She leaves to go out of the country for an undetermined amount of time on 11/26/2023.    I would like her to to see cardiology regarding her EKG results and chest pain before she leaves.    Orders:  -     Cancel: Ambulatory Referral to Cardiology  -     Ambulatory Referral to Cardiology    4. Fatigue, unspecified type  Assessment & Plan:  Patients thyroid function was normal last week.  No signs of anemia on her blood work either.  Have ordered vitamin D levels to be checked.  We will supplement as needed.    Orders:  -     Vitamin D,25-Hydroxy; Future  -     Vitamin D,25-Hydroxy        Follow Up:   Return in about 6 months (around 5/6/2024), or if symptoms worsen or fail to improve, for Recheck.    Healthcare Maintenance:   Counseling provided on her current diet and exercise regimen.  We discussed how to stay active with her busy life and keeping care of her son.  We discussed the importance of staying up-to-date on her Pap smears annually.  We discussed future  screenings like colon cancer with her family history risk.    Raquel Hartley voices understanding and acceptance of this advice and will call back with any further questions or concerns. AVS with preventive healthcare tips printed for patient.     Araceli Garcia PA-C   [facility] [place] [address]

## 2023-11-09 LAB — REF LAB TEST METHOD: NORMAL

## 2023-11-10 ENCOUNTER — OFFICE VISIT (OUTPATIENT)
Dept: CARDIOLOGY | Facility: CLINIC | Age: 38
End: 2023-11-10
Payer: MEDICAID

## 2023-11-10 VITALS
OXYGEN SATURATION: 98 % | SYSTOLIC BLOOD PRESSURE: 108 MMHG | BODY MASS INDEX: 19.97 KG/M2 | HEART RATE: 70 BPM | DIASTOLIC BLOOD PRESSURE: 70 MMHG | HEIGHT: 64 IN | WEIGHT: 117 LBS

## 2023-11-10 DIAGNOSIS — R07.2 PRECORDIAL PAIN: Primary | ICD-10-CM

## 2023-11-10 NOTE — PROGRESS NOTES
"Chief Complaint  Chest Pain (New Patient) and Shortness of Breath      Subjective   History of Present Illness    Problem List  -Precordial pain  -EKG, NSR    Mrs. Hartley is a 38 year old lady being seen for precordial pain.  She gets occasional chest pain that last all day when she gets upset.  Also occasionally when she exerts herself.  No real shortness of breath.  [place] and briefly passed out.  No recurrence.  ROS negative except for the above.         Objective   Vital Signs:  Vitals:    11/10/23 1256   BP: 108/70   Pulse: 70   SpO2: 98%     Estimated body mass index is 20.08 kg/m² as calculated from the following:    Height as of this encounter: 162.6 cm (64\").    Weight as of this encounter: 53.1 kg (117 lb).       Physical Exam  HENT:      Head: Normocephalic.   Eyes:      Extraocular Movements: Extraocular movements intact.   Cardiovascular:      Rate and Rhythm: Normal rate and regular rhythm.      Heart sounds: No murmur heard.     No gallop.   Pulmonary:      Breath sounds: Normal breath sounds.   Abdominal:      Palpations: Abdomen is soft.   Musculoskeletal:      Right lower leg: No edema.      Left lower leg: No edema.   Skin:     General: Skin is warm and dry.   Neurological:      General: No focal deficit present.      Mental Status: She is alert.   Psychiatric:         Mood and Affect: Mood normal.               Assessment   -Precordial pain  -EKG, NSR    Plan   -She is leaving the country soon and wants definitive answer.  Will get coronary CT angiogram.  Follow up depending on results.      Return in about 6 months (around 5/10/2024).  Kvng Vazquez MD  11/10/2023 13:37 EST     "

## 2023-11-13 ENCOUNTER — OFFICE VISIT (OUTPATIENT)
Dept: FAMILY MEDICINE CLINIC | Facility: CLINIC | Age: 38
End: 2023-11-13
Payer: MEDICAID

## 2023-11-13 VITALS
OXYGEN SATURATION: 99 % | WEIGHT: 119.8 LBS | SYSTOLIC BLOOD PRESSURE: 110 MMHG | HEIGHT: 64 IN | HEART RATE: 66 BPM | DIASTOLIC BLOOD PRESSURE: 68 MMHG | BODY MASS INDEX: 20.45 KG/M2

## 2023-11-13 DIAGNOSIS — R87.610 ATYPICAL SQUAMOUS CELL OF UNDETERMINED SIGNIFICANCE OF CERVIX: Primary | ICD-10-CM

## 2023-11-13 DIAGNOSIS — R87.610 ATYPICAL SQUAMOUS CELLS OF UNDETERMINED SIGNIFICANCE (ASC-US) ON CERVICAL PAP SMEAR: ICD-10-CM

## 2023-11-13 NOTE — ASSESSMENT & PLAN NOTE
Will check for presence of HPV today.   Will notify patient of results.  I discussed that if HPV comes back positive then we will refer to gynecology for further testing.    Patient to notify me with any changes to her menstrual cycles or if she begins to develops pain.   Patient understands and is agreeable to this plan.

## 2023-11-13 NOTE — PROGRESS NOTES
Office Note     Name: Raquel Hartley    : 1985     MRN: [id number]     Chief Complaint  Labs Only    Subjective     History of Present Illness:  Raquel Hartley is a 38 y.o. female who presents today for follow-up of her abnormal Pap smear.  Patient had Pap smear done over 1 week ago in the office.  Her Pap smear came back showing atypical squamous cells of undetermined significance.  Patient returns today for HPV genotyping.    Patient denies changes in her menstrual cycle, pelvic pain, spotting between menstrual cycles, vaginal pain or vaginal discharge at this time. Last menstrual cycle was about 2 weeks ago.  No family history of cervical cancer that the patient is aware of.     Review of Systems:   Review of Systems   Constitutional:  Negative for chills, fever and unexpected weight loss.   Respiratory:  Negative for shortness of breath.    Cardiovascular:  Negative for chest pain.   Genitourinary:  Negative for dyspareunia, hematuria, menstrual problem, pelvic pain, vaginal bleeding, vaginal discharge and vaginal pain.       Past Medical History:   Past Medical History:   Diagnosis Date    Anemia     Anxiety     Asthma     Depression     Seizures        Past Surgical History: History reviewed. No pertinent surgical history.    Family History:   Family History   Problem Relation Age of Onset    Diabetes Mother     Cancer Maternal Grandmother         skin    Cancer Paternal Grandmother         colon    Stroke Neg Hx     Hearing loss Neg Hx        Social History:   Social History     Socioeconomic History    Marital status:    Tobacco Use    Smoking status: Never    Smokeless tobacco: Never   Vaping Use    Vaping Use: Never used   Substance and Sexual Activity    Alcohol use: Never    Drug use: Never    Sexual activity: Defer       Immunizations:   There is no immunization history on file for this patient.     Medications:     Current Outpatient Medications:      "albuterol sulfate  (90 Base) MCG/ACT inhaler, Inhale 2 puffs Every 4 (Four) Hours As Needed for Wheezing., Disp: 17 g, Rfl: 6    Allergies:   No Known Allergies    Objective     Vital Signs  /68   Pulse 66   Ht 162.6 cm (64.02\")   Wt 54.3 kg (119 lb 12.8 oz)   SpO2 99%   BMI 20.55 kg/m²   Estimated body mass index is 20.55 kg/m² as calculated from the following:    Height as of this encounter: 162.6 cm (64.02\").    Weight as of this encounter: 54.3 kg (119 lb 12.8 oz).    BMI is within normal parameters. No other follow-up for BMI required.       Physical Exam  Exam conducted with a chaperone present.   Constitutional:       Appearance: Normal appearance.   HENT:      Head: Normocephalic and atraumatic.   Eyes:      Pupils: Pupils are equal, round, and reactive to light.   Cardiovascular:      Rate and Rhythm: Normal rate and regular rhythm.      Pulses: Normal pulses.      Heart sounds: Normal heart sounds.   Pulmonary:      Effort: Pulmonary effort is normal.      Breath sounds: Normal breath sounds.   Abdominal:      General: Abdomen is flat.   Genitourinary:     General: Normal vulva.      Vagina: Normal.      Cervix: Discharge and cervical bleeding present.   Skin:     General: Skin is warm.   Neurological:      General: No focal deficit present.      Mental Status: She is alert and oriented to person, place, and time.   Psychiatric:         Mood and Affect: Mood normal.          Procedures     Results:  No results found for this or any previous visit (from the past 24 hour(s)).     Assessment and Plan     Assessment/Plan:  Diagnoses and all orders for this visit:    1. Atypical squamous cell of undetermined significance of cervix (Primary)  Assessment & Plan:  Will check for presence of HPV today.   Will notify patient of results.  I discussed that if HPV comes back positive then we will refer to gynecology for further testing.    Patient to notify me with any changes to her menstrual cycles " or if she begins to develops pain.   Patient understands and is agreeable to this plan.     Orders:  -     HPV GENOTYPING, [facility]) - , Cervix; Future  -     HPV GENOTYPING, [facility]) - , Cervix        Follow Up  Return if symptoms worsen or fail to improve.    Araceli Garcia PA-C   [facility] [place] [facility]

## 2023-11-16 LAB — REF LAB TEST METHOD: NORMAL

## 2024-01-30 ENCOUNTER — TELEPHONE (OUTPATIENT)
Dept: INFUSION THERAPY | Facility: HOSPITAL | Age: 39
End: 2024-01-30
Payer: MEDICAID

## 2024-01-30 NOTE — TELEPHONE ENCOUNTER
Ms. Hartley reached out to outpatient prep and recovery. Ms. Hart[place]. She had originally told her cardiologist in November that she would need to be scheduled for this scan (CTA coronary for 1/31 currently) emergently or not at all because [place]. [place] does report that [place] around 4th of July holiday and has requested that we schedule her on 7/8/24 any time that day. She says we won't be able to get her on the phone because she uses an international calling janneth but that we can certainly leave a message with her grandmother-whose number is on her chart. Left voicemail and secure chat with  requesting to have Ms. Hartley rescheduled for 7/8/24.

## 2024-02-02 ENCOUNTER — TELEPHONE (OUTPATIENT)
Dept: INFUSION THERAPY | Facility: HOSPITAL | Age: 39
End: 2024-02-02
Payer: MEDICAID

## 2024-02-02 NOTE — TELEPHONE ENCOUNTER
Contacted Ms. Hartley's grandmother-per her instructions-to let her grandmother know that Ms. Hartley's appointment has been rescheduled for July 8th at 8 am.

## 2024-04-16 ENCOUNTER — TELEPHONE (OUTPATIENT)
Dept: CARDIOLOGY | Facility: CLINIC | Age: 39
End: 2024-04-16
Payer: MEDICAID

## 2024-04-16 NOTE — TELEPHONE ENCOUNTER
LEFT MESSAGE WITH MERE IN REGARDS TO RELOCATING TO [place]. NOTIFIED THAT DATE AND TIME ARE STILL THE SAME. [phone] NOT VALID NUMBER.     OK FOR HUB TO MAKE CHANGES 4/16/24

## 2024-05-28 DIAGNOSIS — R07.2 PRECORDIAL PAIN: Primary | ICD-10-CM

## 2024-10-28 ENCOUNTER — READMISSION MANAGEMENT (OUTPATIENT)
Dept: CALL CENTER | Facility: HOSPITAL | Age: 39
End: 2024-10-28
Payer: MEDICAID

## 2024-10-29 NOTE — OUTREACH NOTE
Prep Survey      Flowsheet Row Responses   [facility] facility patient discharged from? Non-BH   Is LACE score < 7 ? Non-BH Discharge   Eligibility Not Eligible   What are the reasons patient is not eligible? Other  [OB]   Does the patient have one of the following disease processes/diagnoses(primary or secondary)? Other   Prep survey completed? Yes            Dilia Luque Registered Nurse

## 2024-11-08 ENCOUNTER — HOSPITAL ENCOUNTER (OUTPATIENT)
Dept: CT IMAGING | Facility: HOSPITAL | Age: 39
Discharge: HOME | End: 2024-11-08
Payer: MEDICAID

## 2024-11-08 VITALS
HEART RATE: 107 BPM | BODY MASS INDEX: 20.14 KG/M2 | TEMPERATURE: 97.8 F | OXYGEN SATURATION: 98 % | WEIGHT: 118 LBS | HEIGHT: 64 IN

## 2024-11-08 DIAGNOSIS — R07.2 PRECORDIAL PAIN: ICD-10-CM
